# Patient Record
Sex: FEMALE | Race: WHITE | Employment: FULL TIME | URBAN - NONMETROPOLITAN AREA
[De-identification: names, ages, dates, MRNs, and addresses within clinical notes are randomized per-mention and may not be internally consistent; named-entity substitution may affect disease eponyms.]

---

## 2021-10-05 ENCOUNTER — NURSE ONLY (OUTPATIENT)
Dept: LAB | Age: 36
End: 2021-10-05

## 2021-10-13 LAB — CYTOLOGY THIN PREP PAP: NORMAL

## 2021-11-05 NOTE — PROGRESS NOTES
PAT call attempted, patient unavailable, left message to please call us back at your earliest convenience; [phone]

## 2021-11-09 NOTE — PROGRESS NOTES
Called Dr. Neal Cowart office and spoke to Sera Jessica inquired if patient having pre-op lab work.  She said \"if I gave the patient an order she will get lab work\"

## 2021-11-11 ENCOUNTER — ANESTHESIA (OUTPATIENT)
Dept: OPERATING ROOM | Age: 36
End: 2021-11-11
Payer: COMMERCIAL

## 2021-11-11 ENCOUNTER — ANESTHESIA EVENT (OUTPATIENT)
Dept: OPERATING ROOM | Age: 36
End: 2021-11-11
Payer: COMMERCIAL

## 2021-11-11 ENCOUNTER — HOSPITAL ENCOUNTER (OUTPATIENT)
Age: 36
Setting detail: OUTPATIENT SURGERY
Discharge: HOME | End: 2021-11-11
Attending: OBSTETRICS & GYNECOLOGY | Admitting: OBSTETRICS & GYNECOLOGY
Payer: COMMERCIAL

## 2021-11-11 VITALS
OXYGEN SATURATION: 100 % | HEART RATE: 72 BPM | DIASTOLIC BLOOD PRESSURE: 61 MMHG | RESPIRATION RATE: 16 BRPM | HEIGHT: 68 IN | SYSTOLIC BLOOD PRESSURE: 118 MMHG | WEIGHT: 161.6 LBS | BODY MASS INDEX: 24.49 KG/M2 | TEMPERATURE: 97.3 F

## 2021-11-11 VITALS — OXYGEN SATURATION: 100 % | DIASTOLIC BLOOD PRESSURE: 56 MMHG | SYSTOLIC BLOOD PRESSURE: 118 MMHG

## 2021-11-11 DIAGNOSIS — Z98.890 S/P ENDOMETRIAL ABLATION: Primary | ICD-10-CM

## 2021-11-11 PROBLEM — N92.0 MENORRHAGIA: Status: ACTIVE | Noted: 2021-11-11

## 2021-11-11 LAB
HCT VFR BLD CALC: 37.1 % (ref 37–47)
HEMOGLOBIN: 12.1 GM/DL (ref 12–16)
MCH RBC QN AUTO: 29 PG (ref 26–33)
MCHC RBC AUTO-ENTMCNC: 32.6 GM/DL (ref 32.2–35.5)
MCV RBC AUTO: 89 FL (ref 81–99)
PDW BLD-RTO: 12.5 % (ref 11.5–14.5)
PLATELET # BLD: 317 THOU/MM3 (ref 130–400)
PMV BLD AUTO: 10.3 FL (ref 9.4–12.4)
PREGNANCY, URINE: NEGATIVE
RBC # BLD: 4.17 MILL/MM3 (ref 4.2–5.4)
WBC # BLD: 5.3 THOU/MM3 (ref 4.8–10.8)

## 2021-11-11 PROCEDURE — 2500000003 HC RX 250 WO HCPCS: Performed by: NURSE ANESTHETIST, CERTIFIED REGISTERED

## 2021-11-11 PROCEDURE — 2720000010 HC SURG SUPPLY STERILE: Performed by: OBSTETRICS & GYNECOLOGY

## 2021-11-11 PROCEDURE — 7100000011 HC PHASE II RECOVERY - ADDTL 15 MIN: Performed by: OBSTETRICS & GYNECOLOGY

## 2021-11-11 PROCEDURE — 3700000001 HC ADD 15 MINUTES (ANESTHESIA): Performed by: OBSTETRICS & GYNECOLOGY

## 2021-11-11 PROCEDURE — 85027 COMPLETE CBC AUTOMATED: CPT

## 2021-11-11 PROCEDURE — 81025 URINE PREGNANCY TEST: CPT

## 2021-11-11 PROCEDURE — 6360000002 HC RX W HCPCS: Performed by: NURSE ANESTHETIST, CERTIFIED REGISTERED

## 2021-11-11 PROCEDURE — 6370000000 HC RX 637 (ALT 250 FOR IP)

## 2021-11-11 PROCEDURE — 7100000010 HC PHASE II RECOVERY - FIRST 15 MIN: Performed by: OBSTETRICS & GYNECOLOGY

## 2021-11-11 PROCEDURE — 7100000000 HC PACU RECOVERY - FIRST 15 MIN: Performed by: OBSTETRICS & GYNECOLOGY

## 2021-11-11 PROCEDURE — 3700000000 HC ANESTHESIA ATTENDED CARE: Performed by: OBSTETRICS & GYNECOLOGY

## 2021-11-11 PROCEDURE — 6360000002 HC RX W HCPCS

## 2021-11-11 PROCEDURE — 3600000003 HC SURGERY LEVEL 3 BASE: Performed by: OBSTETRICS & GYNECOLOGY

## 2021-11-11 PROCEDURE — 3600000013 HC SURGERY LEVEL 3 ADDTL 15MIN: Performed by: OBSTETRICS & GYNECOLOGY

## 2021-11-11 PROCEDURE — 7100000001 HC PACU RECOVERY - ADDTL 15 MIN: Performed by: OBSTETRICS & GYNECOLOGY

## 2021-11-11 PROCEDURE — 2709999900 HC NON-CHARGEABLE SUPPLY: Performed by: OBSTETRICS & GYNECOLOGY

## 2021-11-11 PROCEDURE — 88305 TISSUE EXAM BY PATHOLOGIST: CPT

## 2021-11-11 PROCEDURE — 2580000003 HC RX 258: Performed by: OBSTETRICS & GYNECOLOGY

## 2021-11-11 PROCEDURE — 36415 COLL VENOUS BLD VENIPUNCTURE: CPT

## 2021-11-11 RX ORDER — IBUPROFEN 400 MG/1
800 TABLET ORAL EVERY 8 HOURS PRN
Status: DISCONTINUED | OUTPATIENT
Start: 2021-11-11 | End: 2021-11-11 | Stop reason: HOSPADM

## 2021-11-11 RX ORDER — GLYCOPYRROLATE 1 MG/5 ML
SYRINGE (ML) INTRAVENOUS PRN
Status: DISCONTINUED | OUTPATIENT
Start: 2021-11-11 | End: 2021-11-11 | Stop reason: SDUPTHER

## 2021-11-11 RX ORDER — HYDROCODONE BITARTRATE AND ACETAMINOPHEN 5; 325 MG/1; MG/1
2 TABLET ORAL EVERY 4 HOURS PRN
Status: DISCONTINUED | OUTPATIENT
Start: 2021-11-11 | End: 2021-11-11 | Stop reason: HOSPADM

## 2021-11-11 RX ORDER — PROPOFOL 10 MG/ML
INJECTION, EMULSION INTRAVENOUS PRN
Status: DISCONTINUED | OUTPATIENT
Start: 2021-11-11 | End: 2021-11-11 | Stop reason: SDUPTHER

## 2021-11-11 RX ORDER — KETOROLAC TROMETHAMINE 30 MG/ML
INJECTION, SOLUTION INTRAMUSCULAR; INTRAVENOUS
Status: COMPLETED
Start: 2021-11-11 | End: 2021-11-11

## 2021-11-11 RX ORDER — FENTANYL CITRATE 50 UG/ML
INJECTION, SOLUTION INTRAMUSCULAR; INTRAVENOUS PRN
Status: DISCONTINUED | OUTPATIENT
Start: 2021-11-11 | End: 2021-11-11 | Stop reason: SDUPTHER

## 2021-11-11 RX ORDER — SODIUM CHLORIDE 9 MG/ML
INJECTION, SOLUTION INTRAVENOUS CONTINUOUS
Status: DISCONTINUED | OUTPATIENT
Start: 2021-11-11 | End: 2021-11-11 | Stop reason: HOSPADM

## 2021-11-11 RX ORDER — LABETALOL 20 MG/4 ML (5 MG/ML) INTRAVENOUS SYRINGE
10 EVERY 10 MIN PRN
Status: DISCONTINUED | OUTPATIENT
Start: 2021-11-11 | End: 2021-11-11 | Stop reason: HOSPADM

## 2021-11-11 RX ORDER — HYDROCODONE BITARTRATE AND ACETAMINOPHEN 5; 325 MG/1; MG/1
1 TABLET ORAL EVERY 4 HOURS PRN
Status: DISCONTINUED | OUTPATIENT
Start: 2021-11-11 | End: 2021-11-11 | Stop reason: HOSPADM

## 2021-11-11 RX ORDER — SODIUM CHLORIDE 0.9 % (FLUSH) 0.9 %
5-40 SYRINGE (ML) INJECTION EVERY 12 HOURS SCHEDULED
Status: DISCONTINUED | OUTPATIENT
Start: 2021-11-11 | End: 2021-11-11 | Stop reason: HOSPADM

## 2021-11-11 RX ORDER — FENTANYL CITRATE 50 UG/ML
50 INJECTION, SOLUTION INTRAMUSCULAR; INTRAVENOUS EVERY 5 MIN PRN
Status: DISCONTINUED | OUTPATIENT
Start: 2021-11-11 | End: 2021-11-11 | Stop reason: HOSPADM

## 2021-11-11 RX ORDER — KETOROLAC TROMETHAMINE 30 MG/ML
30 INJECTION, SOLUTION INTRAMUSCULAR; INTRAVENOUS ONCE
Status: COMPLETED | OUTPATIENT
Start: 2021-11-11 | End: 2021-11-11

## 2021-11-11 RX ORDER — HYDROCODONE BITARTRATE AND ACETAMINOPHEN 5; 325 MG/1; MG/1
TABLET ORAL
Status: COMPLETED
Start: 2021-11-11 | End: 2021-11-11

## 2021-11-11 RX ORDER — SODIUM CHLORIDE 0.9 % (FLUSH) 0.9 %
5-40 SYRINGE (ML) INJECTION PRN
Status: DISCONTINUED | OUTPATIENT
Start: 2021-11-11 | End: 2021-11-11 | Stop reason: HOSPADM

## 2021-11-11 RX ORDER — LIDOCAINE HCL/PF 100 MG/5ML
SYRINGE (ML) INJECTION PRN
Status: DISCONTINUED | OUTPATIENT
Start: 2021-11-11 | End: 2021-11-11 | Stop reason: SDUPTHER

## 2021-11-11 RX ORDER — SODIUM CHLORIDE 9 MG/ML
25 INJECTION, SOLUTION INTRAVENOUS PRN
Status: DISCONTINUED | OUTPATIENT
Start: 2021-11-11 | End: 2021-11-11 | Stop reason: HOSPADM

## 2021-11-11 RX ORDER — SODIUM CHLORIDE 0.9 % (FLUSH) 0.9 %
10 SYRINGE (ML) INJECTION EVERY 12 HOURS SCHEDULED
Status: DISCONTINUED | OUTPATIENT
Start: 2021-11-11 | End: 2021-11-11 | Stop reason: HOSPADM

## 2021-11-11 RX ORDER — MIDAZOLAM HYDROCHLORIDE 1 MG/ML
INJECTION INTRAMUSCULAR; INTRAVENOUS PRN
Status: DISCONTINUED | OUTPATIENT
Start: 2021-11-11 | End: 2021-11-11 | Stop reason: SDUPTHER

## 2021-11-11 RX ORDER — SODIUM CHLORIDE 0.9 % (FLUSH) 0.9 %
10 SYRINGE (ML) INJECTION PRN
Status: DISCONTINUED | OUTPATIENT
Start: 2021-11-11 | End: 2021-11-11 | Stop reason: HOSPADM

## 2021-11-11 RX ORDER — MORPHINE SULFATE 2 MG/ML
2 INJECTION, SOLUTION INTRAMUSCULAR; INTRAVENOUS EVERY 5 MIN PRN
Status: DISCONTINUED | OUTPATIENT
Start: 2021-11-11 | End: 2021-11-11 | Stop reason: HOSPADM

## 2021-11-11 RX ORDER — HYDROCODONE BITARTRATE AND ACETAMINOPHEN 5; 325 MG/1; MG/1
1 TABLET ORAL EVERY 6 HOURS PRN
Qty: 10 TABLET | Refills: 0 | Status: SHIPPED | OUTPATIENT
Start: 2021-11-11 | End: 2021-11-14

## 2021-11-11 RX ORDER — ONDANSETRON 2 MG/ML
INJECTION INTRAMUSCULAR; INTRAVENOUS PRN
Status: DISCONTINUED | OUTPATIENT
Start: 2021-11-11 | End: 2021-11-11 | Stop reason: SDUPTHER

## 2021-11-11 RX ADMIN — PROPOFOL 150 MG: 10 INJECTION, EMULSION INTRAVENOUS at 13:53

## 2021-11-11 RX ADMIN — KETOROLAC TROMETHAMINE 30 MG: 30 INJECTION, SOLUTION INTRAMUSCULAR; INTRAVENOUS at 13:20

## 2021-11-11 RX ADMIN — HYDROCODONE BITARTRATE AND ACETAMINOPHEN 1 TABLET: 5; 325 TABLET ORAL at 15:39

## 2021-11-11 RX ADMIN — Medication 0.2 MG: at 14:10

## 2021-11-11 RX ADMIN — Medication 50 MG: at 13:53

## 2021-11-11 RX ADMIN — SODIUM CHLORIDE: 9 INJECTION, SOLUTION INTRAVENOUS at 13:02

## 2021-11-11 RX ADMIN — ONDANSETRON HYDROCHLORIDE 4 MG: 4 INJECTION, SOLUTION INTRAMUSCULAR; INTRAVENOUS at 14:04

## 2021-11-11 RX ADMIN — FENTANYL CITRATE 100 MCG: 50 INJECTION, SOLUTION INTRAMUSCULAR; INTRAVENOUS at 13:52

## 2021-11-11 RX ADMIN — MIDAZOLAM 2 MG: 1 INJECTION INTRAMUSCULAR; INTRAVENOUS at 13:52

## 2021-11-11 ASSESSMENT — PULMONARY FUNCTION TESTS
PIF_VALUE: 4
PIF_VALUE: 1
PIF_VALUE: 10
PIF_VALUE: 3
PIF_VALUE: 10
PIF_VALUE: 2
PIF_VALUE: 2
PIF_VALUE: 1
PIF_VALUE: 10
PIF_VALUE: 4
PIF_VALUE: 10
PIF_VALUE: 1
PIF_VALUE: 4
PIF_VALUE: 10
PIF_VALUE: 7
PIF_VALUE: 1
PIF_VALUE: 10
PIF_VALUE: 7
PIF_VALUE: 14
PIF_VALUE: 4
PIF_VALUE: 10
PIF_VALUE: 6
PIF_VALUE: 15
PIF_VALUE: 1
PIF_VALUE: 10
PIF_VALUE: 4
PIF_VALUE: 10
PIF_VALUE: 4
PIF_VALUE: 10

## 2021-11-11 ASSESSMENT — PAIN SCALES - GENERAL
PAINLEVEL_OUTOF10: 3
PAINLEVEL_OUTOF10: 2
PAINLEVEL_OUTOF10: 0
PAINLEVEL_OUTOF10: 0
PAINLEVEL_OUTOF10: 2
PAINLEVEL_OUTOF10: 1

## 2021-11-11 ASSESSMENT — PAIN DESCRIPTION - LOCATION: LOCATION: ABDOMEN

## 2021-11-11 ASSESSMENT — PAIN DESCRIPTION - DESCRIPTORS: DESCRIPTORS: CRAMPING

## 2021-11-11 ASSESSMENT — PAIN DESCRIPTION - FREQUENCY: FREQUENCY: CONTINUOUS

## 2021-11-11 ASSESSMENT — PAIN DESCRIPTION - PAIN TYPE: TYPE: SURGICAL PAIN

## 2021-11-11 NOTE — OP NOTE
Gyn Service    Operative Report        Pt Name: Yolanda Sweeney  MRN: [id number] Shahrzad #: [de-identified]  YOB: 1985  Procedure Performed By: Lane Gottron, MD, MD      Pre-operative Diagnosis:  MENORRHAGIA    Post-operative Diagnosis:  SAME    PMH:  Past Medical History:   Diagnosis Date    Heart abnormality     MVP on no meds    Mental disorder     anxiety       Procedure:  HYSTEROSCOPY, D AND C, MARYAM ENDOMETRIAL ABLATION    Surgeon:  Lane Gottron, MD     Anesthesia:  General    Estimated blood loss: 5cc    Findings:  Ut sound to 8 cm with a cavity length of 4.5cm. Complications:  NONE    Specimens: Endometrial curettings    Procedure: The patient was taken to the operating room where she was placed  under general anesthesia in dorsolithotomy position, prepped and draped. On  exam, the cervix was mobile and in mid position. The cervix was grasped with  a single-tooth tenaculum and sounded to 8 cm and was progressively dilated. A hysteroscope was placed and normal saline used as the distension medium. Abnormalities of the uterus were not noted. A sharp curettage took place in a 360 degree manner until a gritty texture was noted. A moderate amount of tissue was obtained. The Maryam device  was then placed, the cavity assessed successfully, it was enabled and then  started. It ran for the full 2 minutes. The hysteroscopic camera was placed into the uterus and an adequate burn was achieved. When it was completed the patient was awakened from general anesthesia and taken to the Recovery Room in good condition.         Lane Gottron, MD, MD 11/11/2021 2:28 PM

## 2021-11-11 NOTE — ANESTHESIA PRE PROCEDURE
Department of Anesthesiology  Preprocedure Note       Name:  Molina Vasquez   Age:  43 y.o.  :  1985                                          MRN:  [id number]         Date:  2021      Surgeon: Jeremy Ocasio):  Evelyn Gutierrez MD    Procedure: Procedure(s):  DILATATION AND CURETTAGE HYSTEROSCOPY, ELEAZAR ENDOMETRIAL ABLATION    Medications prior to admission:   Prior to Admission medications    Medication Sig Start Date End Date Taking? Authorizing Provider   sertraline (ZOLOFT) 50 MG tablet Take 50 mg by mouth daily Indications: pt unsure of dosage   Yes Historical Provider, MD   Prenatal MV-Min-Fe Fum-FA-DHA (PRENATAL 1 PO) Take  by mouth. Yes Historical Provider, MD   cetirizine (ZYRTEC) 10 MG tablet Take 10 mg by mouth daily. Yes Historical Provider, MD       Current medications:    Current Facility-Administered Medications   Medication Dose Route Frequency Provider Last Rate Last Admin    0.9 % sodium chloride infusion   IntraVENous Continuous Evelyn Gutierrez MD        sodium chloride flush 0.9 % injection 5-40 mL  5-40 mL IntraVENous 2 times per day Evelyn Gutierrez MD        sodium chloride flush 0.9 % injection 5-40 mL  5-40 mL IntraVENous PRN Evelyn Gutierrez MD        0.9 % sodium chloride infusion  25 mL IntraVENous PRN Evelyn Gutierrez MD        ketorolac (TORADOL) injection 30 mg  30 mg IntraVENous Once Evelyn Gutierrez MD           Allergies:     Allergies   Allergen Reactions    Pollen Extract Other (See Comments)     stuffy nose       Problem List:    Patient Active Problem List   Diagnosis Code    Supervision of normal pregnancy Z34.90       Past Medical History:        Diagnosis Date    Heart abnormality     MVP on no meds    Mental disorder     anxiety       Past Surgical History:        Procedure Laterality Date    WISDOM TOOTH EXTRACTION         Social History:    Social History     Tobacco Use    Smoking status: Former Smoker     Packs/day: 1.50    Smokeless tobacco: Former User     Quit date: 05/2012   Substance Use Topics    Alcohol use: No                                Counseling given: Not Answered      Vital Signs (Current):   Vitals:    11/11/21 1214   BP: 118/65   Pulse: 76   Resp: 14   Temp: 96.9 °F (36.1 °C)   TempSrc: Temporal   SpO2: 100%   Weight: 161 lb 9.6 oz (73.3 kg)   Height: 5' 8\" (1.727 m)                                              BP Readings from Last 3 Encounters:   11/11/21 118/65   07/21/16 127/54       NPO Status: Time of last liquid consumption: 1900 (sip w med this am)                        Time of last solid consumption: 1900                        Date of last liquid consumption: 11/10/21                        Date of last solid food consumption: 11/10/21    BMI:   Wt Readings from Last 3 Encounters:   11/11/21 161 lb 9.6 oz (73.3 kg)   07/19/16 166 lb (75.3 kg)     Body mass index is 24.57 kg/m². CBC:   Lab Results   Component Value Date    WBC 6.6 07/19/2016    RBC 3.74 07/19/2016    HGB 10.7 07/20/2016    HCT 31.4 07/19/2016    MCV 83.8 07/19/2016    RDW 13.3 07/19/2016     07/19/2016       CMP: No results found for: NA, K, CL, CO2, BUN, CREATININE, GFRAA, AGRATIO, LABGLOM, GLUCOSE, PROT, CALCIUM, BILITOT, ALKPHOS, AST, ALT    POC Tests: No results for input(s): POCGLU, POCNA, POCK, POCCL, POCBUN, POCHEMO, POCHCT in the last 72 hours.     Coags: No results found for: PROTIME, INR, APTT    HCG (If Applicable):   Lab Results   Component Value Date    PREGTESTUR negative 11/11/2021        ABGs: No results found for: PHART, PO2ART, ODX7ZQC, LDW4AHU, BEART, O1VWMHBT     Type & Screen (If Applicable):  Lab Results   Component Value Date    LABRH POS 07/19/2016       Drug/Infectious Status (If Applicable):  No results found for: HIV, HEPCAB    COVID-19 Screening (If Applicable): No results found for: COVID19        Anesthesia Evaluation    Airway: Mallampati: II  TM distance: >3 FB   Neck ROM: full  Mouth opening: > = 3 FB Dental: Pulmonary: breath sounds clear to auscultation                             Cardiovascular:            Rhythm: regular                      Neuro/Psych:   (+) psychiatric history:            GI/Hepatic/Renal:             Endo/Other:                     Abdominal:             Vascular: Other Findings:             Anesthesia Plan      general     ASA 2       Induction: intravenous. MIPS: Postoperative opioids intended and Prophylactic antiemetics administered. Anesthetic plan and risks discussed with patient. Plan discussed with CRNA.                   Casa Snwo MD   11/11/2021

## 2021-11-11 NOTE — H&P
[address] [address]  History and Physicial      Patient:  Lilo Delaney  YOB: 1985  MRN: [id number]     Acct: [de-identified]    HISTORY OF PRESENT ILLNESS:     Lilo Delaney is a 43 y.o. female [id number] with menorrhagia and failed outpatient management. Obstetric History: # 1 - Date: 2003, Sex: Male, Weight: None, GA: None, Delivery: Vaginal, Spontaneous, Apgar1: None, Apgar5: None, Living: Living, Birth Comments: None    # 2 - Date: 2007, Sex: Male, Weight: None, GA: None, Delivery: Vaginal, Spontaneous, Apgar1: None, Apgar5: None, Living: Living, Birth Comments: None    # 3 - Date: 2011, Sex: Male, Weight: None, GA: None, Delivery: Vaginal, Spontaneous, Apgar1: None, Apgar5: None, Living: Living, Birth Comments: None    # 4 - Date: 05/07/14, Sex: Unknown, Weight: 8 lb 9.6 oz (3.9 kg), GA: 39w2d, Delivery: Vaginal, Spontaneous, Apgar1: 8, Apgar5: 9, Living: Living, Birth Comments: None    # 5 - Date: 07/19/16, Sex: Male, Weight: 8 lb 9.2 oz (3.89 kg), GA: 40w0d, Delivery: Vaginal, Spontaneous, Apgar1: None, Apgar5: None, Living: Living, Birth Comments: None      Past Medical History:        Diagnosis Date    Heart abnormality     MVP on no meds    Mental disorder     anxiety       Past Surgical History:        Procedure Laterality Date    WISDOM TOOTH EXTRACTION         Medications: Scheduled Meds:   sodium chloride flush  5-40 mL IntraVENous 2 times per day     Continuous Infusions:   sodium chloride 125 mL/hr at 11/11/21 1302    sodium chloride       PRN Meds:.sodium chloride flush, sodium chloride, labetalol, morphine, fentanNYL    Allergies:  Pollen extract    Social History:    reports that she has quit smoking. She smoked 1.50 packs per day. She quit smokeless tobacco use about 9 years ago. She reports that she does not drink alcohol and does not use drugs.     Family History:       Problem Relation Age of Onset    Birth Defects Child     High Blood Pressure Father     Lung Cancer Father        Review of Systems:  General ROS: negative  Respiratory ROS: negative  Cardiovascular ROS: negative  Gastrointestinal ROS: negative  Musculoskeletal ROS: negative  Neurological ROS: negative    Physical Exam:    Vitals:  Patient Vitals for the past 24 hrs:   BP Temp Temp src Pulse Resp SpO2 Height Weight   11/11/21 1214 118/65 96.9 °F (36.1 °C) Temporal 76 14 100 % 5' 8\" (1.727 m) 161 lb 9.6 oz (73.3 kg)          Wt Readings from Last 1 Encounters:   11/11/21 161 lb 9.6 oz (73.3 kg)         General appearance - alert, well appearing, and in no distress  Abdomen - soft, nontender, nondistended, no masses or organomegaly  Pelvic - deferred  Neurological -   Musculoskeletal - no joint tenderness, deformity or swelling  Extremities - peripheral pulses normal, no pedal edema, no clubbing or cyanosis  Skin - normal coloration and turgor, no rashes, no suspicious skin lesions noted      Review of Labs and Diagnostic Testing:      CBC:   Lab Results   Component Value Date    WBC 6.6 07/19/2016    RBC 3.74 07/19/2016    HGB 10.7 07/20/2016    HCT 31.4 07/19/2016    MCV 83.8 07/19/2016    RDW 13.3 07/19/2016     07/19/2016         Radiology:        Assessment:    Patient Active Problem List   Diagnosis    Supervision of normal pregnancy    Menorrhagia         Plan:  1.D&C/H with endometrial ablation      Electronically signed by Shannan Harley MD on 11/11/2021 at 1:43 PM

## 2021-11-11 NOTE — ANESTHESIA POSTPROCEDURE EVALUATION
Department of Anesthesiology  Postprocedure Note    Patient: Yolanda Sweeney  MRN: [id number]  YOB: 1985  Date of evaluation: 11/11/2021  Time:  4:15 PM     Procedure Summary     Date: 11/11/21 Room / Location: [address] [facility]    Anesthesia Start: 1350 Anesthesia Stop: 8992    Procedure: DILATATION AND CURETTAGE HYSTEROSCOPY, ELEAZAR ENDOMETRIAL ABLATION (N/A Uterus) Diagnosis: (MENORRHAGIA)    Surgeons: Lane Gottron, MD Responsible Provider: Loi Jordan MD    Anesthesia Type: general ASA Status: 2          Anesthesia Type: general    Rupa Phase I: Rupa Score: 10    Rupa Phase II: Rupa Score: 10    Last vitals: Reviewed and per EMR flowsheets.        Anesthesia Post Evaluation    Patient location during evaluation: PACU  Patient participation: complete - patient participated  Level of consciousness: awake  Airway patency: patent  Nausea & Vomiting: no vomiting and no nausea  Complications: no  Cardiovascular status: hemodynamically stable  Respiratory status: acceptable  Hydration status: stable

## 2021-11-11 NOTE — DISCHARGE SUMMARY
GYN Surgery  Discharge Summary     Patient ID:  Herbie Krabbe  [id number]  47 y.o.  1985    Admit date: 11/11/2021    Admitting Physician: Prabhakar Samson MD    Discharge Diagnoses: MENORRHAGIA    Discharged Condition: good      Procedures Performed: D&C/H Maryam Endometrial ablation    Hospital Course: Patient was admitted on the day of surgery, and underwent an uncomplicated procedure. See dictated op note. Disposition: home    Patient Instructions:    Activity: activity as tolerated, no sex for 2 weeks and no driving while on analgesics  Diet: regular  Wound Care: as directed    Discharge Medication: @DISCHARGEMEDSLIST(<NOROUTINE> error)@     Discharge Date: 11/11/21    Condition: Good    Follow-up with Prabhakar Samson MD in 1 week    Signed:  Electronically signed by Prabhakar Samson MD on 11/11/2021 at 2:28 PM

## 2021-11-11 NOTE — PROGRESS NOTES
Discharge criteria was met. Patient stated understanding discharge instructions. prescriptions given to patient.  Left via Walkout refused wheelchair

## 2023-02-06 ENCOUNTER — HOSPITAL ENCOUNTER (OUTPATIENT)
Dept: NUCLEAR MEDICINE | Age: 38
Discharge: HOME | End: 2023-02-06
Payer: COMMERCIAL

## 2023-02-06 DIAGNOSIS — R10.11 ABDOMINAL PAIN, RIGHT UPPER QUADRANT: ICD-10-CM

## 2023-02-06 DIAGNOSIS — R10.13 ABDOMINAL PAIN, EPIGASTRIC: ICD-10-CM

## 2023-02-06 PROCEDURE — 3430000000 HC RX DIAGNOSTIC RADIOPHARMACEUTICAL: Performed by: NURSE PRACTITIONER

## 2023-02-06 PROCEDURE — A9537 TC99M MEBROFENIN: HCPCS | Performed by: NURSE PRACTITIONER

## 2023-02-06 PROCEDURE — 78227 HEPATOBIL SYST IMAGE W/DRUG: CPT

## 2023-02-06 RX ADMIN — Medication 7.9 MILLICURIE: at 09:27

## 2023-10-03 ENCOUNTER — NURSE ONLY (OUTPATIENT)
Dept: LAB | Age: 38
End: 2023-10-03

## 2023-10-10 LAB — CYTOLOGY THIN PREP PAP: NORMAL

## 2024-05-13 ENCOUNTER — APPOINTMENT (OUTPATIENT)
Dept: GENERAL RADIOLOGY | Age: 39
End: 2024-05-13
Payer: COMMERCIAL

## 2024-05-13 ENCOUNTER — HOSPITAL ENCOUNTER (EMERGENCY)
Age: 39
Discharge: HOME | End: 2024-05-13
Attending: EMERGENCY MEDICINE
Payer: COMMERCIAL

## 2024-05-13 VITALS
SYSTOLIC BLOOD PRESSURE: 126 MMHG | OXYGEN SATURATION: 99 % | TEMPERATURE: 98 F | BODY MASS INDEX: 25.01 KG/M2 | RESPIRATION RATE: 14 BRPM | WEIGHT: 165 LBS | HEART RATE: 59 BPM | DIASTOLIC BLOOD PRESSURE: 60 MMHG | HEIGHT: 68 IN

## 2024-05-13 DIAGNOSIS — I30.9 ACUTE PERICARDITIS, UNSPECIFIED TYPE: Primary | ICD-10-CM

## 2024-05-13 DIAGNOSIS — R07.9 CHEST PAIN, UNSPECIFIED TYPE: ICD-10-CM

## 2024-05-13 LAB
ALBUMIN SERPL BCG-MCNC: 4.4 G/DL (ref 3.5–5.1)
ALP SERPL-CCNC: 54 U/L (ref 38–126)
ALT SERPL W/O P-5'-P-CCNC: 21 U/L (ref 11–66)
ANION GAP SERPL CALC-SCNC: 11 MEQ/L (ref 8–16)
AST SERPL-CCNC: 19 U/L (ref 5–40)
BASOPHILS ABSOLUTE: 0 THOU/MM3 (ref 0–0.1)
BASOPHILS NFR BLD AUTO: 0.8 %
BILIRUB CONJ SERPL-MCNC: < 0.2 MG/DL (ref 0–0.3)
BILIRUB SERPL-MCNC: 0.5 MG/DL (ref 0.3–1.2)
BUN SERPL-MCNC: 7 MG/DL (ref 7–22)
CALCIUM SERPL-MCNC: 9.1 MG/DL (ref 8.5–10.5)
CHLORIDE SERPL-SCNC: 108 MEQ/L (ref 98–111)
CO2 SERPL-SCNC: 22 MEQ/L (ref 23–33)
CREAT SERPL-MCNC: 0.5 MG/DL (ref 0.4–1.2)
DEPRECATED RDW RBC AUTO: 38.6 FL (ref 35–45)
EKG ATRIAL RATE: 53 BPM
EKG ATRIAL RATE: 57 BPM
EKG P AXIS: 41 DEGREES
EKG P-R INTERVAL: 132 MS
EKG Q-T INTERVAL: 442 MS
EKG Q-T INTERVAL: 454 MS
EKG QRS DURATION: 64 MS
EKG QRS DURATION: 92 MS
EKG QTC CALCULATION (BAZETT): 394 MS
EKG QTC CALCULATION (BAZETT): 441 MS
EKG R AXIS: 4 DEGREES
EKG R AXIS: 65 DEGREES
EKG T AXIS: 70 DEGREES
EKG T AXIS: 77 DEGREES
EKG VENTRICULAR RATE: 48 BPM
EKG VENTRICULAR RATE: 57 BPM
EOSINOPHIL NFR BLD AUTO: 3.7 %
EOSINOPHILS ABSOLUTE: 0.2 THOU/MM3 (ref 0–0.4)
ERYTHROCYTE [DISTWIDTH] IN BLOOD BY AUTOMATED COUNT: 12 % (ref 11.5–14.5)
GFR SERPL CREATININE-BSD FRML MDRD: > 90 ML/MIN/1.73M2
GLUCOSE SERPL-MCNC: 92 MG/DL (ref 70–108)
HCT VFR BLD AUTO: 40.3 % (ref 37–47)
HGB BLD-MCNC: 13.9 GM/DL (ref 12–16)
IMM GRANULOCYTES # BLD AUTO: 0.01 THOU/MM3 (ref 0–0.07)
IMM GRANULOCYTES NFR BLD AUTO: 0.2 %
LIPASE SERPL-CCNC: 30 U/L (ref 5.6–51.3)
LYMPHOCYTES ABSOLUTE: 2 THOU/MM3 (ref 1–4.8)
LYMPHOCYTES NFR BLD AUTO: 33.9 %
MCH RBC QN AUTO: 30.6 PG (ref 26–33)
MCHC RBC AUTO-ENTMCNC: 34.5 GM/DL (ref 32.2–35.5)
MCV RBC AUTO: 88.8 FL (ref 81–99)
MONOCYTES ABSOLUTE: 0.5 THOU/MM3 (ref 0.4–1.3)
MONOCYTES NFR BLD AUTO: 8.4 %
NEUTROPHILS ABSOLUTE: 3.1 THOU/MM3 (ref 1.8–7.7)
NEUTROPHILS NFR BLD AUTO: 53 %
NRBC BLD AUTO-RTO: 0 /100 WBC
OSMOLALITY SERPL CALC.SUM OF ELEC: 278.9 MOSMOL/KG (ref 275–300)
PLATELET # BLD AUTO: 326 THOU/MM3 (ref 130–400)
PMV BLD AUTO: 9.6 FL (ref 9.4–12.4)
POTASSIUM SERPL-SCNC: 3.8 MEQ/L (ref 3.5–5.2)
PROT SERPL-MCNC: 6.7 G/DL (ref 6.1–8)
RBC # BLD AUTO: 4.54 MILL/MM3 (ref 4.2–5.4)
SODIUM SERPL-SCNC: 141 MEQ/L (ref 135–145)
TROPONIN, HIGH SENSITIVITY: < 6 NG/L (ref 0–12)
WBC # BLD AUTO: 5.9 THOU/MM3 (ref 4.8–10.8)

## 2024-05-13 PROCEDURE — 99203 OFFICE O/P NEW LOW 30 MIN: CPT | Performed by: NURSE PRACTITIONER

## 2024-05-13 PROCEDURE — 99285 EMERGENCY DEPT VISIT HI MDM: CPT

## 2024-05-13 PROCEDURE — 6360000002 HC RX W HCPCS: Performed by: EMERGENCY MEDICINE

## 2024-05-13 PROCEDURE — 99215 OFFICE O/P EST HI 40 MIN: CPT

## 2024-05-13 PROCEDURE — 36415 COLL VENOUS BLD VENIPUNCTURE: CPT

## 2024-05-13 PROCEDURE — 2500000003 HC RX 250 WO HCPCS: Performed by: EMERGENCY MEDICINE

## 2024-05-13 PROCEDURE — 84484 ASSAY OF TROPONIN QUANT: CPT

## 2024-05-13 PROCEDURE — 96375 TX/PRO/DX INJ NEW DRUG ADDON: CPT

## 2024-05-13 PROCEDURE — 96374 THER/PROPH/DIAG INJ IV PUSH: CPT

## 2024-05-13 PROCEDURE — 2580000003 HC RX 258: Performed by: EMERGENCY MEDICINE

## 2024-05-13 PROCEDURE — 71046 X-RAY EXAM CHEST 2 VIEWS: CPT

## 2024-05-13 PROCEDURE — 83690 ASSAY OF LIPASE: CPT

## 2024-05-13 PROCEDURE — 93010 ELECTROCARDIOGRAM REPORT: CPT | Performed by: INTERNAL MEDICINE

## 2024-05-13 PROCEDURE — 80053 COMPREHEN METABOLIC PANEL: CPT

## 2024-05-13 PROCEDURE — 82248 BILIRUBIN DIRECT: CPT

## 2024-05-13 PROCEDURE — 85025 COMPLETE CBC W/AUTO DIFF WBC: CPT

## 2024-05-13 PROCEDURE — 93005 ELECTROCARDIOGRAM TRACING: CPT

## 2024-05-13 PROCEDURE — 6370000000 HC RX 637 (ALT 250 FOR IP): Performed by: EMERGENCY MEDICINE

## 2024-05-13 PROCEDURE — 93005 ELECTROCARDIOGRAM TRACING: CPT | Performed by: EMERGENCY MEDICINE

## 2024-05-13 PROCEDURE — 93005 ELECTROCARDIOGRAM TRACING: CPT | Performed by: NURSE PRACTITIONER

## 2024-05-13 PROCEDURE — A4216 STERILE WATER/SALINE, 10 ML: HCPCS | Performed by: EMERGENCY MEDICINE

## 2024-05-13 RX ORDER — OMEGA-3S/DHA/EPA/FISH OIL/D3 300MG-1000
400 CAPSULE ORAL DAILY
COMMUNITY

## 2024-05-13 RX ORDER — IBUPROFEN 600 MG/1
600 TABLET ORAL EVERY 6 HOURS
Qty: 120 TABLET | Refills: 0 | Status: SHIPPED | OUTPATIENT
Start: 2024-05-13

## 2024-05-13 RX ORDER — ONDANSETRON 2 MG/ML
4 INJECTION INTRAMUSCULAR; INTRAVENOUS ONCE
Status: COMPLETED | OUTPATIENT
Start: 2024-05-13 | End: 2024-05-13

## 2024-05-13 RX ORDER — FAMOTIDINE 10 MG
10 TABLET ORAL 2 TIMES DAILY
COMMUNITY

## 2024-05-13 RX ORDER — VITAMIN B COMPLEX
1 CAPSULE ORAL DAILY
COMMUNITY

## 2024-05-13 RX ORDER — KETOROLAC TROMETHAMINE 30 MG/ML
15 INJECTION, SOLUTION INTRAMUSCULAR; INTRAVENOUS ONCE
Status: COMPLETED | OUTPATIENT
Start: 2024-05-13 | End: 2024-05-13

## 2024-05-13 RX ADMIN — KETOROLAC TROMETHAMINE 15 MG: 30 INJECTION, SOLUTION INTRAMUSCULAR at 19:05

## 2024-05-13 RX ADMIN — LIDOCAINE HYDROCHLORIDE: 20 SOLUTION ORAL at 17:39

## 2024-05-13 RX ADMIN — FAMOTIDINE 20 MG: 10 INJECTION, SOLUTION INTRAVENOUS at 17:43

## 2024-05-13 RX ADMIN — ONDANSETRON 4 MG: 2 INJECTION INTRAMUSCULAR; INTRAVENOUS at 17:41

## 2024-05-13 ASSESSMENT — ENCOUNTER SYMPTOMS
APNEA: 0
STRIDOR: 0
RHINORRHEA: 0
SORE THROAT: 0
ABDOMINAL DISTENTION: 0
FACIAL SWELLING: 0
TROUBLE SWALLOWING: 0
HEARTBURN: 0
DIARRHEA: 0
CONSTIPATION: 0
CHOKING: 0
ORTHOPNEA: 0
CHEST TIGHTNESS: 0
VOMITING: 0
SHORTNESS OF BREATH: 0
GASTROINTESTINAL NEGATIVE: 1
COUGH: 0
WHEEZING: 0
VOICE CHANGE: 0
BACK PAIN: 1
COLOR CHANGE: 0
ABDOMINAL PAIN: 0
NAUSEA: 0

## 2024-05-13 ASSESSMENT — PAIN SCALES - GENERAL: PAINLEVEL_OUTOF10: 4

## 2024-05-13 ASSESSMENT — PAIN - FUNCTIONAL ASSESSMENT
PAIN_FUNCTIONAL_ASSESSMENT: NONE - DENIES PAIN
PAIN_FUNCTIONAL_ASSESSMENT: 0-10
PAIN_FUNCTIONAL_ASSESSMENT: 0-10

## 2024-05-13 ASSESSMENT — PAIN DESCRIPTION - PAIN TYPE: TYPE: ACUTE PAIN

## 2024-05-13 ASSESSMENT — PAIN DESCRIPTION - DESCRIPTORS: DESCRIPTORS: DULL;PRESSURE

## 2024-05-13 ASSESSMENT — HEART SCORE: ECG: NORMAL

## 2024-05-13 NOTE — ED TRIAGE NOTES
Presented at desk with chest pain shoots through to back between shoulder blades. Brought to room 5 for 12 lead EKG. Pt states she woke with chest pain at 0300 this am and describes as dull pressure that shoot through to back and intermittent left arm tingling from elbow down with nausea. Pt states she almost went in to ER this am but decided against this until she spoke with mother who convinced her to be seen.

## 2024-05-13 NOTE — ED PROVIDER NOTES
[facility]  EMERGENCY DEPARTMENT ENCOUNTER          Pt Name: Tamica Bejarano  MRN: [id number]  Birthdate 1985  Date of evaluation: 5/13/2024  ED Resident: Samara Chaparro MD  ED Attending: Dr. Avina    CHIEF COMPLAINT       Chief Complaint   Patient presents with    Chest Pain     Chest pain -woke from sleep at 0300 this am dull pressure intermittent left arm tingling from elbow down     History obtained from the patient.    HISTORY OF PRESENT ILLNESS    HPI  Tamica Bejarano is a 39 y.o. female who presents to the emergency department for evaluation of chest pain.    Patient was sent from urgent care. Shestates that she woke up about 3 in the morning with pain in the center of her chest and felt like it was stabbing through to her back.  The pain is worse whenever she tries to lay back in bed and is slightly relieved when she leans forward.  Patient has a history of GERD, but states that this feels different than GERD, she also has a history of gallbladder dysfunction, HIDA scan in the past saying that she has a \"slow\" gallbladder and she took amitriptyline for this, but only took it for 1 year.  She denies shortness of breath, cough, fever, nausea, vomiting, diarrhea.      The patient has no other acute complaints at this time.    PAST MEDICAL AND SURGICAL HISTORY     Past Medical History:   Diagnosis Date    Heart abnormality     MVP on no meds    Mental disorder     anxiety     Past Surgical History:   Procedure Laterality Date    DILATION AND CURETTAGE OF UTERUS N/A 11/11/2021    DILATATION AND CURETTAGE HYSTEROSCOPY, ELEAZAR ENDOMETRIAL ABLATION performed by Elham Strauss MD at [facility] OR    WISDOM TOOTH EXTRACTION         MEDICATIONS   No current facility-administered medications for this encounter.    Current Outpatient Medications:     famotidine (PEPCID) 10 MG tablet, Take 1 tablet by mouth 2 times daily, Disp: , Rfl:     b complex vitamins capsule, Take 1 
[facility] EMERGENCY DEPT  Urgent Care Encounter      CHIEF COMPLAINT       Chief Complaint   Patient presents with    Chest Pain     Chest pain -woke from sleep at 0300 this am dull pressure intermittent left arm tingling from elbow down       Nurses Notes reviewed and I agree except as noted in the HPI.  HISTORY OFPRESENT ILLNESS   Tamica Bejarano is a 39 y.o.  The history is provided by the patient and a relative. No  was used.   Chest Pain  Pain location:  Substernal area  Pain quality: pressure    Pain quality: not aching, not burning, not crushing, not dull, not hot, not radiating, not sharp, not shooting, not stabbing, not tearing, not throbbing and no tightness    Pain radiates to:  Upper back  Pain severity:  Severe  Onset quality:  Sudden  Duration:  12 hours  Timing:  Constant  Progression:  Waxing and waning  Chronicity:  New  Context: at rest    Context: not breathing, not drug use, not eating, not intercourse, not lifting, not movement, not raising an arm, not stress and not trauma    Relieved by:  Nothing  Worsened by:  Movement  Ineffective treatments:  Certain positions  Associated symptoms: back pain    Associated symptoms: no abdominal pain, no AICD problem, no altered mental status, no anorexia, no anxiety, no claudication, no cough, no diaphoresis, no dizziness, no dysphagia, no fatigue, no fever, no headache, no heartburn, no lower extremity edema, no nausea, no near-syncope, no numbness, no orthopnea, no palpitations, no PND, no shortness of breath, no syncope, no vomiting and no weakness    Risk factors: no aortic disease, no birth control, no coronary artery disease, no diabetes mellitus, no Richard-Danlos syndrome, no high cholesterol, no hypertension, no immobilization, not male, no Marfan's syndrome, not obese, not pregnant, no prior DVT/PE, no smoking and no surgery        REVIEW OF SYSTEMS     Review of Systems   Constitutional:  Negative for activity change, appetite 
ACID (VITAMIN C IMMUNE HEALTH PO)    Take by mouth    B COMPLEX VITAMINS CAPSULE    Take 1 capsule by mouth daily    CETIRIZINE (ZYRTEC) 10 MG TABLET    Take 1 tablet by mouth daily    FAMOTIDINE (PEPCID) 10 MG TABLET    Take 1 tablet by mouth 2 times daily    PRENATAL MV-MIN-FE FUM-FA-DHA (PRENATAL 1 PO)    Take  by mouth.    SERTRALINE (ZOLOFT) 50 MG TABLET    Take 1 tablet by mouth daily Indications: pt unsure of dosage    VITAMIN D3 (CHOLECALCIFEROL) 10 MCG (400 UNIT) TABS TABLET    Take 1 tablet by mouth daily       ALLERGIES     Patient is is allergic to pollen extract.    Patients   Immunization History   Administered Date(s) Administered    TDaP, ADACEL (age 10y-64y), BOOSTRIX (age 10y+), IM, 0.5mL 05/09/2014, 07/21/2016       FAMILY HISTORY     Patient's family history includes Birth Defects in her child; High Blood Pressure in her father; Lung Cancer in her father.    SOCIAL HISTORY     Patient  reports that she has quit smoking. Her smoking use included cigarettes. She quit smokeless tobacco use about 12 years ago. She reports that she does not drink alcohol and does not use drugs.    PHYSICAL EXAM     ED TRIAGE VITALS  BP: (!) 122/56, Temp: 98 °F (36.7 °C), Pulse: 66, Respirations: 16, SpO2: 99 %,Estimated body mass index is 25.09 kg/m² as calculated from the following:    Height as of this encounter: 1.727 m (5' 8\").    Weight as of this encounter: 74.8 kg (165 lb).,Patient's last menstrual period was 04/15/2024 (approximate).    Physical Exam  Constitutional:       General: She is awake.      Appearance: Normal appearance. She is well-developed, well-groomed and normal weight. She is not ill-appearing, toxic-appearing or diaphoretic.   HENT:      Head: Normocephalic and atraumatic.   Neck:      Trachea: Trachea and phonation normal.   Cardiovascular:      Rate and Rhythm: Regular rhythm. Bradycardia present.      Pulses: Normal pulses.   Pulmonary:      Effort: Pulmonary effort is normal.      Breath

## 2024-05-13 NOTE — ED NOTES
Pt to er. Pt coming for CP that started at 0300 this morning. States pain gets worse when she lies down and then sits up. Pt denies taking anything for pain. Denies SOB. Resp regular. Call light inr each.

## 2024-05-13 NOTE — ED NOTES
Pt medicated per MAR. Pt resting in bed. Pt describes pain as dull with pressure as if there is a weight on her chest. Family at bedside. Call light within reach.

## 2024-05-13 NOTE — DISCHARGE INSTRUCTIONS
Emergency department for chest pain.  Based on your workup, and your symptoms, you may have pericarditis.  This is treated with anti-inflammatory medications, and you will be prescribed motrin.  You have also been scheduled a follow-up appointment with cardiology's chest pain clinic on Wednesday 5/15 at 10 AM.  If your symptoms get worse, return to the ER.

## 2024-05-15 ENCOUNTER — OFFICE VISIT (OUTPATIENT)
Dept: CARDIOLOGY CLINIC | Age: 39
End: 2024-05-15
Payer: COMMERCIAL

## 2024-05-15 VITALS
HEIGHT: 68 IN | DIASTOLIC BLOOD PRESSURE: 61 MMHG | SYSTOLIC BLOOD PRESSURE: 105 MMHG | HEART RATE: 54 BPM | BODY MASS INDEX: 26.1 KG/M2 | WEIGHT: 172.2 LBS

## 2024-05-15 DIAGNOSIS — Z09 HOSPITAL DISCHARGE FOLLOW-UP: ICD-10-CM

## 2024-05-15 DIAGNOSIS — R07.89 CHEST PAIN, ATYPICAL: Primary | ICD-10-CM

## 2024-05-15 PROCEDURE — 99204 OFFICE O/P NEW MOD 45 MIN: CPT | Performed by: INTERNAL MEDICINE

## 2024-05-15 PROCEDURE — 1111F DSCHRG MED/CURRENT MED MERGE: CPT | Performed by: INTERNAL MEDICINE

## 2024-05-15 NOTE — PROGRESS NOTES
Chief Complaint   Patient presents with    New Patient    Follow-Up from Hospital    Chest Pain         New patient  ED follow up for CP.    Chest pain since for 3 days - was constant, help with ibuprofen,7/10, non radiating,  and over the last 3 days resolved,  Not exertion induced  Worse with change of position  Had back pain then    Given toradol  in ER and sent home on ibuprofen    Today almost gone    EKG done 5-.    Denied sob, palpitation, dizziness  or edema  '  Nonsmoker    FHX  Parent okay  Great grandpa had MI and CVA          Past Surgical History:   Procedure Laterality Date    DILATION AND CURETTAGE OF UTERUS N/A 11/11/2021    DILATATION AND CURETTAGE HYSTEROSCOPY, ELEAZAR ENDOMETRIAL ABLATION performed by Elham Strauss MD at [facility] OR    WISDOM TOOTH EXTRACTION         Allergies   Allergen Reactions    Pollen Extract Other (See Comments)     stuffy nose        Family History   Problem Relation Age of Onset    Birth Defects Child     High Blood Pressure Father     Lung Cancer Father         Social History     Socioeconomic History    Marital status:      Spouse name: Not on file    Number of children: Not on file    Years of education: Not on file    Highest education level: Not on file   Occupational History    Not on file   Tobacco Use    Smoking status: Former     Current packs/day: 1.50     Types: Cigarettes    Smokeless tobacco: Former     Quit date: 05/2012   Vaping Use    Vaping Use: Some days    Substances: Nicotine   Substance and Sexual Activity    Alcohol use: No    Drug use: No    Sexual activity: Yes     Partners: Male   Other Topics Concern    Not on file   Social History Narrative    Not on file     Social Determinants of Health     Financial Resource Strain: Not on file   Food Insecurity: Not on file   Transportation Needs: Not on file   Physical Activity: Not on file   Stress: Not on file   Social Connections: Not on file   Intimate Partner Violence: Not on file

## 2024-10-08 ENCOUNTER — LAB (OUTPATIENT)
Dept: LAB | Age: 39
End: 2024-10-08

## 2024-10-10 LAB
C. TRACHOMATIS DNA,THIN PREP: NEGATIVE
N. GONORRHOEAE DNA, THIN PREP: NEGATIVE
SOURCE: NORMAL
SOURCE: NORMAL
TRICHOMONAS VAGINALI, MOLECULAR: NEGATIVE

## 2024-10-14 LAB — CYTOLOGY THIN PREP PAP: NORMAL

## (undated) DEVICE — TUBING, SUCTION, 1/4" X 20', STRAIGHT: Brand: MEDLINE INDUSTRIES, INC.

## (undated) DEVICE — PAD,NON-ADHERENT,3X8,STERILE,LF,1/PK: Brand: MEDLINE

## (undated) DEVICE — CATHETER,URETHRAL,REDRUBBER,STRL,14FR: Brand: MEDLINE INDUSTRIES, INC.

## (undated) DEVICE — GLOVE SURG SZ 6 THK91MIL LTX FREE SYN POLYISOPRENE ANTI

## (undated) DEVICE — HANDPIECE ABLAT DISP FOR ENDOMET SYS

## (undated) DEVICE — PACK PROC LAP II AURORA

## (undated) DEVICE — Y-TYPE TUR/BLADDER IRRIGATION SET, REGULATING CLAMP

## (undated) DEVICE — PACK-MAJOR

## (undated) DEVICE — PREP SOL PVP IODINE 4%  4 OZ/BTL

## (undated) DEVICE — Z DISCONTINUED BY MEDLINE USE 2711682 TRAY SKIN PREP DRY W/ PREM GLV

## (undated) DEVICE — BASIC SINGLE BASIN BTC-LF: Brand: MEDLINE INDUSTRIES, INC.

## (undated) DEVICE — SOLUTION SURG PREP POV IOD 7.5% 4 OZ

## (undated) DEVICE — TOTAL TRAY, DB, 100% SILI FOLEY, 16FR 10: Brand: MEDLINE

## (undated) DEVICE — PAD,SANITARY,11 IN,MAXI,W/WINGS,N-STRL: Brand: MEDLINE

## (undated) DEVICE — COVER,LIGHT HANDLE,FLX,2/PK: Brand: MEDLINE INDUSTRIES, INC.

## (undated) DEVICE — GAUZE,SPONGE,8"X4",12PLY,XRAY,STRL,LF: Brand: MEDLINE

## (undated) DEVICE — MARKER,SKIN,WI/RULER AND LABELS: Brand: MEDLINE